# Patient Record
Sex: FEMALE | Race: WHITE
[De-identification: names, ages, dates, MRNs, and addresses within clinical notes are randomized per-mention and may not be internally consistent; named-entity substitution may affect disease eponyms.]

---

## 2020-09-24 ENCOUNTER — HOSPITAL ENCOUNTER (OUTPATIENT)
Dept: HOSPITAL 19 - SDCO | Age: 50
Discharge: HOME | End: 2020-09-24
Attending: FAMILY MEDICINE
Payer: COMMERCIAL

## 2020-09-24 VITALS — HEART RATE: 66 BPM | SYSTOLIC BLOOD PRESSURE: 95 MMHG | DIASTOLIC BLOOD PRESSURE: 66 MMHG

## 2020-09-24 VITALS — SYSTOLIC BLOOD PRESSURE: 96 MMHG | TEMPERATURE: 97.4 F | HEART RATE: 87 BPM | DIASTOLIC BLOOD PRESSURE: 67 MMHG

## 2020-09-24 VITALS — BODY MASS INDEX: 17.89 KG/M2 | HEIGHT: 65.98 IN | WEIGHT: 111.33 LBS

## 2020-09-24 VITALS — SYSTOLIC BLOOD PRESSURE: 90 MMHG | DIASTOLIC BLOOD PRESSURE: 56 MMHG | HEART RATE: 73 BPM | TEMPERATURE: 96.9 F

## 2020-09-24 DIAGNOSIS — G40.909: ICD-10-CM

## 2020-09-24 DIAGNOSIS — Z20.828: ICD-10-CM

## 2020-09-24 DIAGNOSIS — D12.4: ICD-10-CM

## 2020-09-24 DIAGNOSIS — D12.5: ICD-10-CM

## 2020-09-24 DIAGNOSIS — Z12.11: Primary | ICD-10-CM

## 2020-09-24 DIAGNOSIS — Z88.8: ICD-10-CM

## 2020-09-24 DIAGNOSIS — G43.909: ICD-10-CM

## 2020-09-24 NOTE — NUR
TO RM 4 PER CART FROM ENDOSCOPY. ALERT ORIENTED X3, TALKING WITH STAFF.
AMBULATED TO RECLINER WITH ASSIST.
DR LEO INTO TALK WITH PATIENT.
RECEIVED WATER AND COFFEE

## 2020-09-24 NOTE — NUR
RECEIVED DISCHARGE INSTRUCTIONS AND VERBALIZED UNDERSTANDING.
DISCONTINUED IV AND INT- CATHETER INTACT.

## 2021-11-18 ENCOUNTER — HOSPITAL ENCOUNTER (OUTPATIENT)
Dept: HOSPITAL 19 - MC.RAD | Age: 51
End: 2021-11-18
Payer: COMMERCIAL

## 2021-11-18 DIAGNOSIS — Z12.31: Primary | ICD-10-CM
